# Patient Record
Sex: MALE | Race: WHITE
[De-identification: names, ages, dates, MRNs, and addresses within clinical notes are randomized per-mention and may not be internally consistent; named-entity substitution may affect disease eponyms.]

---

## 2022-02-01 PROBLEM — Z00.00 ENCOUNTER FOR PREVENTIVE HEALTH EXAMINATION: Status: ACTIVE | Noted: 2022-02-01

## 2022-02-09 ENCOUNTER — APPOINTMENT (OUTPATIENT)
Dept: HEART AND VASCULAR | Facility: CLINIC | Age: 47
End: 2022-02-09

## 2022-02-14 ENCOUNTER — NON-APPOINTMENT (OUTPATIENT)
Age: 47
End: 2022-02-14

## 2022-02-14 ENCOUNTER — APPOINTMENT (OUTPATIENT)
Dept: HEART AND VASCULAR | Facility: CLINIC | Age: 47
End: 2022-02-14
Payer: COMMERCIAL

## 2022-02-14 VITALS
SYSTOLIC BLOOD PRESSURE: 118 MMHG | HEART RATE: 95 BPM | HEIGHT: 69 IN | BODY MASS INDEX: 28.14 KG/M2 | WEIGHT: 190 LBS | DIASTOLIC BLOOD PRESSURE: 85 MMHG

## 2022-02-14 DIAGNOSIS — R56.9 UNSPECIFIED CONVULSIONS: ICD-10-CM

## 2022-02-14 DIAGNOSIS — D75.1 SECONDARY POLYCYTHEMIA: ICD-10-CM

## 2022-02-14 DIAGNOSIS — G47.33 OBSTRUCTIVE SLEEP APNEA (ADULT) (PEDIATRIC): ICD-10-CM

## 2022-02-14 PROCEDURE — 99203 OFFICE O/P NEW LOW 30 MIN: CPT

## 2022-02-14 PROCEDURE — 93000 ELECTROCARDIOGRAM COMPLETE: CPT

## 2022-02-14 PROCEDURE — 93306 TTE W/DOPPLER COMPLETE: CPT

## 2022-02-14 PROCEDURE — ZZZZZ: CPT

## 2022-02-16 PROBLEM — R56.9 SEIZURES: Status: ACTIVE | Noted: 2022-02-14

## 2022-02-16 PROBLEM — D75.1 ERYTHROCYTOSIS: Status: ACTIVE | Noted: 2022-02-16

## 2022-02-16 NOTE — HISTORY OF PRESENT ILLNESS
[FreeTextEntry1] : 46 -year-old male with prior history of sleep apnea recent evaluation for erythrocytosis with a hematocrit of 55 and a platelet count of 116.  He was seen by his hematologist who wanted to make sure there is no secondary causes for the erythrocytosis looking for specifically pulmonary hypertension due to related lung disease.  Patient has excellent effort capacity walking robustly every day with no symptoms of shortness of breath leg edema abdominal girth increase or other cardiovascular symptoms.

## 2022-02-16 NOTE — REVIEW OF SYSTEMS
[Feeling Fatigued] : feeling fatigued [SOB] : shortness of breath [Dyspnea on exertion] : dyspnea during exertion [Negative] : ENT [Chest Discomfort] : no chest discomfort [Orthopnea] : no orthopnea

## 2022-02-16 NOTE — ASSESSMENT
[FreeTextEntry1] : Impression\par Patient's cardiac evaluation here included an EKG which revealed sinus rhythm with no significant axis abnormalities echocardiogram revealed structurally normal function with no evidence of right ventricular dilatation or pulmonary pressure elevations that would be suggestive of pulmonary hypertension.